# Patient Record
Sex: MALE | Race: WHITE | NOT HISPANIC OR LATINO | Employment: OTHER | URBAN - NONMETROPOLITAN AREA
[De-identification: names, ages, dates, MRNs, and addresses within clinical notes are randomized per-mention and may not be internally consistent; named-entity substitution may affect disease eponyms.]

---

## 2017-05-25 ENCOUNTER — APPOINTMENT (OUTPATIENT)
Dept: GENERAL RADIOLOGY | Facility: HOSPITAL | Age: 72
End: 2017-05-25

## 2017-05-25 ENCOUNTER — HOSPITAL ENCOUNTER (EMERGENCY)
Facility: HOSPITAL | Age: 72
Discharge: HOME | End: 2017-05-25
Attending: FAMILY MEDICINE | Admitting: FAMILY MEDICINE

## 2017-05-25 VITALS
HEIGHT: 67 IN | RESPIRATION RATE: 14 BRPM | BODY MASS INDEX: 25.9 KG/M2 | TEMPERATURE: 97.9 F | HEART RATE: 101 BPM | WEIGHT: 165 LBS | DIASTOLIC BLOOD PRESSURE: 93 MMHG | OXYGEN SATURATION: 97 % | SYSTOLIC BLOOD PRESSURE: 136 MMHG

## 2017-05-25 DIAGNOSIS — R19.7 DIARRHEA, UNSPECIFIED TYPE: ICD-10-CM

## 2017-05-25 DIAGNOSIS — R07.9 CHEST PAIN, UNSPECIFIED TYPE: ICD-10-CM

## 2017-05-25 DIAGNOSIS — K52.9 GASTROENTERITIS: Primary | ICD-10-CM

## 2017-05-25 LAB
ALBUMIN SERPL-MCNC: 4 G/DL (ref 3.5–5)
ALBUMIN/GLOB SERPL: 1.3 G/DL (ref 1.1–2.5)
ALP SERPL-CCNC: 81 U/L (ref 24–120)
ALT SERPL W P-5'-P-CCNC: 15 U/L (ref 0–54)
AMYLASE SERPL-CCNC: 95 U/L (ref 30–110)
ANION GAP SERPL CALCULATED.3IONS-SCNC: 12 MMOL/L (ref 4–13)
AST SERPL-CCNC: 46 U/L (ref 7–45)
BASOPHILS # BLD AUTO: 0.02 10*3/MM3 (ref 0–0.2)
BASOPHILS NFR BLD AUTO: 0.2 % (ref 0–2)
BILIRUB SERPL-MCNC: 0.9 MG/DL (ref 0.1–1)
BILIRUB UR QL STRIP: NEGATIVE
BUN BLD-MCNC: 25 MG/DL (ref 5–21)
BUN/CREAT SERPL: 21.4 (ref 7–25)
CALCIUM SPEC-SCNC: 9.7 MG/DL (ref 8.4–10.4)
CHLORIDE SERPL-SCNC: 104 MMOL/L (ref 98–110)
CK MB SERPL-CCNC: <0.22 NG/ML (ref 0–5)
CLARITY UR: CLEAR
CO2 SERPL-SCNC: 25 MMOL/L (ref 24–31)
COLOR UR: YELLOW
CREAT BLD-MCNC: 1.17 MG/DL (ref 0.5–1.4)
CRP SERPL-MCNC: 0.58 MG/DL (ref 0–0.99)
DEPRECATED RDW RBC AUTO: 45.8 FL (ref 40–54)
EOSINOPHIL # BLD AUTO: 0.07 10*3/MM3 (ref 0–0.7)
EOSINOPHIL NFR BLD AUTO: 0.6 % (ref 0–4)
ERYTHROCYTE [DISTWIDTH] IN BLOOD BY AUTOMATED COUNT: 13.7 % (ref 12–15)
GFR SERPL CREATININE-BSD FRML MDRD: 62 ML/MIN/1.73
GLOBULIN UR ELPH-MCNC: 3 GM/DL
GLUCOSE BLD-MCNC: 117 MG/DL (ref 70–100)
GLUCOSE UR STRIP-MCNC: NEGATIVE MG/DL
HCT VFR BLD AUTO: 48.8 % (ref 40–52)
HGB BLD-MCNC: 16.3 G/DL (ref 14–18)
HGB UR QL STRIP.AUTO: NEGATIVE
HOLD SPECIMEN: NORMAL
HOLD SPECIMEN: NORMAL
KETONES UR QL STRIP: ABNORMAL
LEUKOCYTE ESTERASE UR QL STRIP.AUTO: NEGATIVE
LIPASE SERPL-CCNC: 81 U/L (ref 23–203)
LYMPHOCYTES # BLD AUTO: 0.72 10*3/MM3 (ref 0.72–4.86)
LYMPHOCYTES NFR BLD AUTO: 6.1 % (ref 15–45)
MCH RBC QN AUTO: 31.2 PG (ref 28–32)
MCHC RBC AUTO-ENTMCNC: 33.4 G/DL (ref 33–36)
MCV RBC AUTO: 93.5 FL (ref 82–95)
MONOCYTES # BLD AUTO: 0.38 10*3/MM3 (ref 0.19–1.3)
MONOCYTES NFR BLD AUTO: 3.2 % (ref 4–12)
MYOGLOBIN SERPL-MCNC: 23 NG/ML (ref 0–110)
NEUTROPHILS # BLD AUTO: 10.53 10*3/MM3 (ref 1.87–8.4)
NEUTROPHILS NFR BLD AUTO: 89.9 % (ref 39–78)
NITRITE UR QL STRIP: NEGATIVE
NT-PROBNP SERPL-MCNC: 344 PG/ML (ref 0–900)
PH UR STRIP.AUTO: <=5 [PH] (ref 5–8)
PLATELET # BLD AUTO: 172 10*3/MM3 (ref 130–400)
PMV BLD AUTO: 10.3 FL (ref 6–12)
POTASSIUM BLD-SCNC: 4.2 MMOL/L (ref 3.5–5.3)
PROT SERPL-MCNC: 7 G/DL (ref 6.3–8.7)
PROT UR QL STRIP: NEGATIVE
RBC # BLD AUTO: 5.22 10*6/MM3 (ref 4.8–5.9)
SODIUM BLD-SCNC: 141 MMOL/L (ref 135–145)
SP GR UR STRIP: 1.03 (ref 1–1.03)
TROPONIN I SERPL-MCNC: 0 NG/ML (ref 0–0.07)
TROPONIN I SERPL-MCNC: 0 NG/ML (ref 0–0.07)
UROBILINOGEN UR QL STRIP: ABNORMAL
WBC NRBC COR # BLD: 11.72 10*3/MM3 (ref 4.8–10.8)
WHOLE BLOOD HOLD SPECIMEN: NORMAL
WHOLE BLOOD HOLD SPECIMEN: NORMAL

## 2017-05-25 PROCEDURE — 93005 ELECTROCARDIOGRAM TRACING: CPT | Performed by: FAMILY MEDICINE

## 2017-05-25 PROCEDURE — 82150 ASSAY OF AMYLASE: CPT | Performed by: FAMILY MEDICINE

## 2017-05-25 PROCEDURE — 25010000002 ONDANSETRON PER 1 MG: Performed by: FAMILY MEDICINE

## 2017-05-25 PROCEDURE — 83880 ASSAY OF NATRIURETIC PEPTIDE: CPT | Performed by: FAMILY MEDICINE

## 2017-05-25 PROCEDURE — 81003 URINALYSIS AUTO W/O SCOPE: CPT | Performed by: FAMILY MEDICINE

## 2017-05-25 PROCEDURE — 80053 COMPREHEN METABOLIC PANEL: CPT | Performed by: FAMILY MEDICINE

## 2017-05-25 PROCEDURE — 36415 COLL VENOUS BLD VENIPUNCTURE: CPT

## 2017-05-25 PROCEDURE — 82553 CREATINE MB FRACTION: CPT | Performed by: FAMILY MEDICINE

## 2017-05-25 PROCEDURE — 93005 ELECTROCARDIOGRAM TRACING: CPT

## 2017-05-25 PROCEDURE — 99285 EMERGENCY DEPT VISIT HI MDM: CPT

## 2017-05-25 PROCEDURE — 96374 THER/PROPH/DIAG INJ IV PUSH: CPT

## 2017-05-25 PROCEDURE — 71010 HC CHEST PA OR AP: CPT

## 2017-05-25 PROCEDURE — 93010 ELECTROCARDIOGRAM REPORT: CPT | Performed by: INTERNAL MEDICINE

## 2017-05-25 PROCEDURE — 84484 ASSAY OF TROPONIN QUANT: CPT

## 2017-05-25 PROCEDURE — 85025 COMPLETE CBC W/AUTO DIFF WBC: CPT | Performed by: FAMILY MEDICINE

## 2017-05-25 PROCEDURE — 83690 ASSAY OF LIPASE: CPT | Performed by: FAMILY MEDICINE

## 2017-05-25 PROCEDURE — 83874 ASSAY OF MYOGLOBIN: CPT | Performed by: FAMILY MEDICINE

## 2017-05-25 PROCEDURE — 86140 C-REACTIVE PROTEIN: CPT | Performed by: FAMILY MEDICINE

## 2017-05-25 RX ORDER — LORAZEPAM 1 MG/1
1 TABLET ORAL 2 TIMES DAILY
COMMUNITY

## 2017-05-25 RX ORDER — LEVOCETIRIZINE DIHYDROCHLORIDE 5 MG/1
5 TABLET, FILM COATED ORAL EVERY EVENING
Status: ON HOLD | COMMUNITY
End: 2022-01-04

## 2017-05-25 RX ORDER — SODIUM CHLORIDE 9 MG/ML
125 INJECTION, SOLUTION INTRAVENOUS CONTINUOUS
Status: DISCONTINUED | OUTPATIENT
Start: 2017-05-25 | End: 2017-05-25 | Stop reason: HOSPADM

## 2017-05-25 RX ORDER — ONDANSETRON 4 MG/1
4 TABLET, ORALLY DISINTEGRATING ORAL 4 TIMES DAILY PRN
Qty: 20 TABLET | Refills: 0 | Status: SHIPPED | OUTPATIENT
Start: 2017-05-25 | End: 2021-12-30

## 2017-05-25 RX ORDER — MAGNESIUM HYDROXIDE/ALUMINUM HYDROXICE/SIMETHICONE 120; 1200; 1200 MG/30ML; MG/30ML; MG/30ML
30 SUSPENSION ORAL ONCE
Status: COMPLETED | OUTPATIENT
Start: 2017-05-25 | End: 2017-05-25

## 2017-05-25 RX ORDER — RANITIDINE 150 MG/1
150 TABLET ORAL 2 TIMES DAILY
Qty: 30 TABLET | Refills: 0 | Status: SHIPPED | OUTPATIENT
Start: 2017-05-25 | End: 2017-06-09

## 2017-05-25 RX ORDER — ATENOLOL 50 MG/1
75 TABLET ORAL DAILY
COMMUNITY

## 2017-05-25 RX ORDER — ONDANSETRON 2 MG/ML
4 INJECTION INTRAMUSCULAR; INTRAVENOUS ONCE
Status: COMPLETED | OUTPATIENT
Start: 2017-05-25 | End: 2017-05-25

## 2017-05-25 RX ORDER — TADALAFIL 20 MG/1
20 TABLET ORAL DAILY PRN
COMMUNITY
End: 2017-05-25

## 2017-05-25 RX ORDER — ASPIRIN 81 MG/1
81 TABLET ORAL DAILY
COMMUNITY
End: 2021-12-30

## 2017-05-25 RX ORDER — OMEPRAZOLE 20 MG/1
20 CAPSULE, DELAYED RELEASE ORAL DAILY
COMMUNITY

## 2017-05-25 RX ADMIN — LIDOCAINE HYDROCHLORIDE 15 ML: 20 SOLUTION ORAL; TOPICAL at 14:43

## 2017-05-25 RX ADMIN — ALUMINUM HYDROXIDE, MAGNESIUM HYDROXIDE, AND SIMETHICONE 30 ML: 200; 200; 20 SUSPENSION ORAL at 14:42

## 2017-05-25 RX ADMIN — ONDANSETRON 4 MG: 2 INJECTION INTRAMUSCULAR; INTRAVENOUS at 14:42

## 2017-05-30 ENCOUNTER — APPOINTMENT (OUTPATIENT)
Dept: CARDIOLOGY | Facility: HOSPITAL | Age: 72
End: 2017-05-30
Attending: FAMILY MEDICINE

## 2021-12-13 ENCOUNTER — OFFICE VISIT (OUTPATIENT)
Dept: OTOLARYNGOLOGY | Facility: CLINIC | Age: 76
End: 2021-12-13

## 2021-12-13 VITALS
RESPIRATION RATE: 20 BRPM | HEIGHT: 67 IN | SYSTOLIC BLOOD PRESSURE: 142 MMHG | WEIGHT: 165 LBS | DIASTOLIC BLOOD PRESSURE: 74 MMHG | TEMPERATURE: 98 F | HEART RATE: 82 BPM | BODY MASS INDEX: 25.9 KG/M2

## 2021-12-13 DIAGNOSIS — C44.41 BASAL CELL CARCINOMA, SCALP/NECK: Primary | ICD-10-CM

## 2021-12-13 PROCEDURE — 99203 OFFICE O/P NEW LOW 30 MIN: CPT | Performed by: OTOLARYNGOLOGY

## 2021-12-13 RX ORDER — TAMSULOSIN HYDROCHLORIDE 0.4 MG/1
1 CAPSULE ORAL NIGHTLY
COMMUNITY

## 2021-12-13 RX ORDER — GUAIFENESIN 600 MG/1
600 TABLET, EXTENDED RELEASE ORAL AS NEEDED
COMMUNITY

## 2021-12-13 NOTE — PROGRESS NOTES
PRIMARY CARE PROVIDER: Sanjeev Meraz MD  REFERRING PROVIDER: No ref. provider found    Chief Complaint   Patient presents with   • Skin Lesion     bcc of scalp       Subjective   History of Present Illness:  Sabas Jones is a  76 y.o. male who presents for surgical consultation regarding a biopsy-proven basal cell carcinoma of the left scalp.  Prior to the biopsy, the lesion had the following characteristics:    Quality: Itching area  Severity: Moderate  Duration: Months  Timing: constant  Modifying Factors: None  Associated Signs & Symptoms: No pain or lymph node swelling    Review of Systems:  Review of Systems   Constitutional: Negative for chills, fatigue, fever and unexpected weight change.   HENT: Negative for facial swelling.    Respiratory: Negative for cough, chest tightness and shortness of breath.    Cardiovascular: Negative for chest pain.   Musculoskeletal: Negative for neck pain.   Skin: Negative for color change.   Neurological: Negative for facial asymmetry.   Hematological: Negative for adenopathy. Does not bruise/bleed easily.       Past History:  Past Medical History:   Diagnosis Date   • BCC (basal cell carcinoma)     scalp bcc   • Hypertension      Past Surgical History:   Procedure Laterality Date   • KNEE SURGERY       History reviewed. No pertinent family history.  Social History     Tobacco Use   • Smoking status: Never Smoker   • Smokeless tobacco: Never Used   Substance Use Topics   • Alcohol use: Yes     Comment: 1-2 liquor drinks /week   • Drug use: No     Allergies:  Patient has no known allergies.    Current Outpatient Medications:   •  atenolol (TENORMIN) 50 MG tablet, Take 50 mg by mouth Daily., Disp: , Rfl:   •  clomiPHENE (CLOMID) 50 MG tablet, Take 25 mg by mouth Daily. Take half tablet for 25 days of each month.  PATIENT REPORTS THAT HE TAKES IT UP UNTIL THE 25TH AND THEN STARTS IT BACK ON THE 1ST OF THE MONTH, Disp: , Rfl:   •  guaiFENesin (Mucinex) 600 MG 12 hr tablet,  Mucinex  1 tablet weekly, Disp: , Rfl:   •  levocetirizine (XYZAL) 5 MG tablet, Take 5 mg by mouth Every Evening., Disp: , Rfl:   •  LORazepam (ATIVAN) 1 MG tablet, Take 1 mg by mouth 2 (Two) Times a Day., Disp: , Rfl:   •  omeprazole (priLOSEC) 20 MG capsule, Take 20 mg by mouth Daily., Disp: , Rfl:   •  ondansetron ODT (ZOFRAN-ODT) 4 MG disintegrating tablet, Take 1 tablet by mouth 4 (Four) Times a Day As Needed for Nausea or Vomiting., Disp: 20 tablet, Rfl: 0  •  tamsulosin (FLOMAX) 0.4 MG capsule 24 hr capsule, tamsulosin 0.4 mg capsule  TAKE 1 CAPSULE BY MOUTH EVERY DAY, Disp: , Rfl:   •  aspirin 81 MG EC tablet, Take 81 mg by mouth Daily., Disp: , Rfl:     Objective     Vital Signs:  Temp:  [98 °F (36.7 °C)] 98 °F (36.7 °C)  Heart Rate:  [82] 82  Resp:  [20] 20  BP: (142)/(74) 142/74    Physical Exam:  Physical Exam  Vitals and nursing note reviewed.   Constitutional:       General: He is not in acute distress.     Appearance: He is well-developed. He is not diaphoretic.   HENT:      Head: Normocephalic and atraumatic.        Right Ear: External ear normal.      Left Ear: External ear normal.      Nose: Nose normal.   Eyes:      General: No scleral icterus.        Right eye: No discharge.         Left eye: No discharge.      Conjunctiva/sclera: Conjunctivae normal.      Pupils: Pupils are equal, round, and reactive to light.   Neck:      Thyroid: No thyromegaly.      Vascular: No JVD.      Trachea: No tracheal deviation.   Pulmonary:      Effort: Pulmonary effort is normal.      Breath sounds: No stridor.   Musculoskeletal:         General: No deformity. Normal range of motion.      Cervical back: Normal range of motion and neck supple.   Lymphadenopathy:      Cervical: No cervical adenopathy.   Skin:     General: Skin is warm and dry.      Coloration: Skin is not pale.      Findings: No erythema or rash.   Neurological:      Mental Status: He is alert and oriented to person, place, and time.      Cranial  Nerves: No cranial nerve deficit.      Coordination: Coordination normal.   Psychiatric:         Speech: Speech normal.         Behavior: Behavior normal. Behavior is cooperative.         Thought Content: Thought content normal.         Judgment: Judgment normal.         Data Review:  I have personally reviewed the pathology report demonstrating a basal cell carcinoma of the scalp::      Operative plan:    Rotational scalp flap    Assessment   1. Basal cell carcinoma, scalp/neck        Plan     I have offered excision of the basal cell carcinoma of the scalp with  frozen section analysis and likely  rotational flap closure closure in the operating room under anesthesia.    Discussion of skin lesion. Discussed risks, benefits, alternatives, and possible complications of excision of the skin lesion with reconstruction utilizing local tissue rearrangement, full-thickness skin grafting, or local interpolated flaps. Risks include, but are not limited too: bleeding, infection, hematoma, recurrence, need for additional procedures, flap failure, cosmetic deformity. Patient understands risks and would like to proceed with surgery.     My findings and recommendations were discussed and questions were answered.     Mannie Armenta MD  12/13/21  11:18 CST

## 2021-12-15 PROBLEM — C44.41 BASAL CELL CARCINOMA, SCALP/NECK: Status: ACTIVE | Noted: 2021-12-15

## 2021-12-16 ENCOUNTER — NURSE TRIAGE (OUTPATIENT)
Dept: CALL CENTER | Facility: HOSPITAL | Age: 76
End: 2021-12-16

## 2021-12-17 NOTE — TELEPHONE ENCOUNTER
"    Reason for Disposition  • Blood in urine  (Exception: could be normal menstrual bleeding)    Additional Information  • Negative: Shock suspected (e.g., cold/pale/clammy skin, too weak to stand, low BP, rapid pulse)  • Negative: Sounds like a life-threatening emergency to the triager  • Negative: Urinary catheter, questions about  • Negative: Recent back or abdominal injury  • Negative: Recent genital injury  • Negative: [1] Unable to urinate (or only a few drops) > 4 hours AND [2] bladder feels very full (e.g., palpable bladder or strong urge to urinate)  • Negative: Passing pure blood or large blood clots (i.e., size > a dime) (Exception: silver or small strands)  • Negative: Fever > 100.4 F (38.0 C)  • Negative: Patient sounds very sick or weak to the triager  • Negative: [1] Pain or burning with passing urine AND [2] side (flank) or back pain present  • Negative: Known sickle cell disease  • Negative: Taking Coumadin (warfarin) or other strong blood thinner, or known bleeding disorder (e.g., thrombocytopenia)  • Negative: Pain or burning with passing urine  • Negative: Side (flank) or back pain present  • Negative: [1] Pink or red-colored urine and likely from food (beets, rhubarb, red food dye) AND [2] lasts > 24 hours after stopping food    Answer Assessment - Initial Assessment Questions  1. COLOR of URINE: \"Describe the color of the urine.\"  (e.g., tea-colored, pink, red, blood clots, bloody)       yellow  2. ONSET: \"When did the bleeding start?\"      tonight  3. EPISODES: \"How many times has there been blood in the urine?\" or \"How many times today?\"      2   4. PAIN with URINATION: \"Is there any pain with passing your urine?\" If Yes, ask: \"How bad is the pain?\"  (Scale 1-10; or mild, moderate, severe)     - MILD - complains slightly about urination hurting     - MODERATE - interferes with normal activities       - SEVERE - excruciating, unwilling or unable to urinate because of the pain       no pain  5. " "FEVER: \"Do you have a fever?\" If Yes, ask: \"What is your temperature, how was it measured, and when did it start?\"      \denies  6. ASSOCIATED SYMPTOMS: \"Are you passing urine more frequently than usual?\"      no  7. OTHER SYMPTOMS: \"Do you have any other symptoms?\" (e.g., back/flank pain, abdominal pain, vomiting)      \none, has 2 little black thiongs that came out after uyrinating last time. He will call doctor in morning and bring them to appt    8. PREGNANCY: \"Is there any chance you are pregnant?\" \"When was your last menstrual period?\"      no    Protocols used: URINE - BLOOD IN-ADULT-      "

## 2021-12-30 ENCOUNTER — LAB (OUTPATIENT)
Dept: LAB | Facility: HOSPITAL | Age: 76
End: 2021-12-30

## 2021-12-30 ENCOUNTER — PRE-ADMISSION TESTING (OUTPATIENT)
Dept: PREADMISSION TESTING | Facility: HOSPITAL | Age: 76
End: 2021-12-30

## 2021-12-30 VITALS
DIASTOLIC BLOOD PRESSURE: 91 MMHG | BODY MASS INDEX: 26.44 KG/M2 | HEART RATE: 75 BPM | OXYGEN SATURATION: 100 % | HEIGHT: 67 IN | WEIGHT: 168.43 LBS | RESPIRATION RATE: 20 BRPM | SYSTOLIC BLOOD PRESSURE: 140 MMHG

## 2021-12-30 DIAGNOSIS — C44.41 BASAL CELL CARCINOMA, SCALP/NECK: ICD-10-CM

## 2021-12-30 LAB
ANION GAP SERPL CALCULATED.3IONS-SCNC: 9 MMOL/L (ref 5–15)
BUN SERPL-MCNC: 22 MG/DL (ref 8–23)
BUN/CREAT SERPL: 21 (ref 7–25)
CALCIUM SPEC-SCNC: 9.5 MG/DL (ref 8.6–10.5)
CHLORIDE SERPL-SCNC: 106 MMOL/L (ref 98–107)
CO2 SERPL-SCNC: 27 MMOL/L (ref 22–29)
CREAT SERPL-MCNC: 1.05 MG/DL (ref 0.76–1.27)
DEPRECATED RDW RBC AUTO: 48.4 FL (ref 37–54)
ERYTHROCYTE [DISTWIDTH] IN BLOOD BY AUTOMATED COUNT: 13.6 % (ref 12.3–15.4)
GFR SERPL CREATININE-BSD FRML MDRD: 69 ML/MIN/1.73
GLUCOSE SERPL-MCNC: 110 MG/DL (ref 65–99)
HCT VFR BLD AUTO: 45.4 % (ref 37.5–51)
HGB BLD-MCNC: 14.3 G/DL (ref 13–17.7)
MCH RBC QN AUTO: 30.4 PG (ref 26.6–33)
MCHC RBC AUTO-ENTMCNC: 31.5 G/DL (ref 31.5–35.7)
MCV RBC AUTO: 96.4 FL (ref 79–97)
PLATELET # BLD AUTO: 199 10*3/MM3 (ref 140–450)
PMV BLD AUTO: 10.1 FL (ref 6–12)
POTASSIUM SERPL-SCNC: 4.6 MMOL/L (ref 3.5–5.2)
RBC # BLD AUTO: 4.71 10*6/MM3 (ref 4.14–5.8)
SARS-COV-2 ORF1AB RESP QL NAA+PROBE: NOT DETECTED
SODIUM SERPL-SCNC: 142 MMOL/L (ref 136–145)
WBC NRBC COR # BLD: 6.74 10*3/MM3 (ref 3.4–10.8)

## 2021-12-30 PROCEDURE — 85027 COMPLETE CBC AUTOMATED: CPT

## 2021-12-30 PROCEDURE — 36415 COLL VENOUS BLD VENIPUNCTURE: CPT

## 2021-12-30 PROCEDURE — U0004 COV-19 TEST NON-CDC HGH THRU: HCPCS

## 2021-12-30 PROCEDURE — 93005 ELECTROCARDIOGRAM TRACING: CPT

## 2021-12-30 PROCEDURE — 93010 ELECTROCARDIOGRAM REPORT: CPT | Performed by: INTERNAL MEDICINE

## 2021-12-30 PROCEDURE — C9803 HOPD COVID-19 SPEC COLLECT: HCPCS

## 2021-12-30 PROCEDURE — 80048 BASIC METABOLIC PNL TOTAL CA: CPT

## 2021-12-30 RX ORDER — MULTIPLE VITAMINS W/ MINERALS TAB 9MG-400MCG
1 TAB ORAL DAILY
COMMUNITY

## 2021-12-30 RX ORDER — CLINDAMYCIN PHOSPHATE 11.9 MG/ML
1 SOLUTION TOPICAL NIGHTLY PRN
COMMUNITY

## 2021-12-31 LAB
QT INTERVAL: 380 MS
QTC INTERVAL: 416 MS

## 2022-01-04 ENCOUNTER — ANESTHESIA EVENT (OUTPATIENT)
Dept: PERIOP | Facility: HOSPITAL | Age: 77
End: 2022-01-04

## 2022-01-04 ENCOUNTER — HOSPITAL ENCOUNTER (OUTPATIENT)
Facility: HOSPITAL | Age: 77
Setting detail: HOSPITAL OUTPATIENT SURGERY
Discharge: HOME | End: 2022-01-04
Attending: OTOLARYNGOLOGY | Admitting: OTOLARYNGOLOGY

## 2022-01-04 ENCOUNTER — ANESTHESIA (OUTPATIENT)
Dept: PERIOP | Facility: HOSPITAL | Age: 77
End: 2022-01-04

## 2022-01-04 VITALS
DIASTOLIC BLOOD PRESSURE: 72 MMHG | SYSTOLIC BLOOD PRESSURE: 127 MMHG | RESPIRATION RATE: 16 BRPM | TEMPERATURE: 97.4 F | OXYGEN SATURATION: 92 % | HEART RATE: 67 BPM

## 2022-01-04 DIAGNOSIS — C44.41 BASAL CELL CARCINOMA, SCALP/NECK: ICD-10-CM

## 2022-01-04 PROCEDURE — 14021 TIS TRNFR S/A/L 10.1-30 SQCM: CPT | Performed by: OTOLARYNGOLOGY

## 2022-01-04 PROCEDURE — 25010000002 DEXAMETHASONE PER 1 MG: Performed by: ANESTHESIOLOGY

## 2022-01-04 PROCEDURE — 88331 PATH CONSLTJ SURG 1 BLK 1SPC: CPT | Performed by: PATHOLOGY

## 2022-01-04 PROCEDURE — 25010000002 CEFAZOLIN PER 500 MG: Performed by: OTOLARYNGOLOGY

## 2022-01-04 PROCEDURE — 25010000002 ONDANSETRON PER 1 MG: Performed by: NURSE ANESTHETIST, CERTIFIED REGISTERED

## 2022-01-04 PROCEDURE — 25010000002 FENTANYL CITRATE (PF) 100 MCG/2ML SOLUTION: Performed by: NURSE ANESTHETIST, CERTIFIED REGISTERED

## 2022-01-04 PROCEDURE — 25010000002 ONDANSETRON PER 1 MG: Performed by: ANESTHESIOLOGY

## 2022-01-04 PROCEDURE — 88305 TISSUE EXAM BY PATHOLOGIST: CPT | Performed by: OTOLARYNGOLOGY

## 2022-01-04 PROCEDURE — 25010000002 PROPOFOL 10 MG/ML EMULSION: Performed by: NURSE ANESTHETIST, CERTIFIED REGISTERED

## 2022-01-04 PROCEDURE — 25010000002 DEXAMETHASONE PER 1 MG: Performed by: NURSE ANESTHETIST, CERTIFIED REGISTERED

## 2022-01-04 RX ORDER — EPHEDRINE SULFATE 50 MG/ML
INJECTION, SOLUTION INTRAVENOUS AS NEEDED
Status: DISCONTINUED | OUTPATIENT
Start: 2022-01-04 | End: 2022-01-04 | Stop reason: SURG

## 2022-01-04 RX ORDER — PROPOFOL 10 MG/ML
VIAL (ML) INTRAVENOUS AS NEEDED
Status: DISCONTINUED | OUTPATIENT
Start: 2022-01-04 | End: 2022-01-04 | Stop reason: SURG

## 2022-01-04 RX ORDER — SODIUM CHLORIDE 0.9 % (FLUSH) 0.9 %
3 SYRINGE (ML) INJECTION AS NEEDED
Status: DISCONTINUED | OUTPATIENT
Start: 2022-01-04 | End: 2022-01-04 | Stop reason: HOSPADM

## 2022-01-04 RX ORDER — SODIUM CHLORIDE 0.9 % (FLUSH) 0.9 %
3-10 SYRINGE (ML) INJECTION AS NEEDED
Status: DISCONTINUED | OUTPATIENT
Start: 2022-01-04 | End: 2022-01-04 | Stop reason: HOSPADM

## 2022-01-04 RX ORDER — FLUMAZENIL 0.1 MG/ML
0.2 INJECTION INTRAVENOUS AS NEEDED
Status: DISCONTINUED | OUTPATIENT
Start: 2022-01-04 | End: 2022-01-04 | Stop reason: HOSPADM

## 2022-01-04 RX ORDER — IBUPROFEN 600 MG/1
600 TABLET ORAL ONCE AS NEEDED
Status: DISCONTINUED | OUTPATIENT
Start: 2022-01-04 | End: 2022-01-04 | Stop reason: HOSPADM

## 2022-01-04 RX ORDER — LIDOCAINE HYDROCHLORIDE 20 MG/ML
INJECTION, SOLUTION EPIDURAL; INFILTRATION; INTRACAUDAL; PERINEURAL AS NEEDED
Status: DISCONTINUED | OUTPATIENT
Start: 2022-01-04 | End: 2022-01-04 | Stop reason: SURG

## 2022-01-04 RX ORDER — LIDOCAINE HYDROCHLORIDE 10 MG/ML
0.5 INJECTION, SOLUTION EPIDURAL; INFILTRATION; INTRACAUDAL; PERINEURAL ONCE AS NEEDED
Status: DISCONTINUED | OUTPATIENT
Start: 2022-01-04 | End: 2022-01-04 | Stop reason: HOSPADM

## 2022-01-04 RX ORDER — HYDROCODONE BITARTRATE AND ACETAMINOPHEN 7.5; 325 MG/1; MG/1
1 TABLET ORAL EVERY 4 HOURS PRN
Qty: 18 TABLET | Refills: 0 | Status: SHIPPED | OUTPATIENT
Start: 2022-01-04 | End: 2022-01-07

## 2022-01-04 RX ORDER — ONDANSETRON 2 MG/ML
INJECTION INTRAMUSCULAR; INTRAVENOUS AS NEEDED
Status: DISCONTINUED | OUTPATIENT
Start: 2022-01-04 | End: 2022-01-04 | Stop reason: SURG

## 2022-01-04 RX ORDER — FENTANYL CITRATE 50 UG/ML
INJECTION, SOLUTION INTRAMUSCULAR; INTRAVENOUS AS NEEDED
Status: DISCONTINUED | OUTPATIENT
Start: 2022-01-04 | End: 2022-01-04 | Stop reason: SURG

## 2022-01-04 RX ORDER — SODIUM CHLORIDE, SODIUM LACTATE, POTASSIUM CHLORIDE, CALCIUM CHLORIDE 600; 310; 30; 20 MG/100ML; MG/100ML; MG/100ML; MG/100ML
1000 INJECTION, SOLUTION INTRAVENOUS CONTINUOUS
Status: DISCONTINUED | OUTPATIENT
Start: 2022-01-04 | End: 2022-01-04 | Stop reason: HOSPADM

## 2022-01-04 RX ORDER — LABETALOL HYDROCHLORIDE 5 MG/ML
5 INJECTION, SOLUTION INTRAVENOUS
Status: DISCONTINUED | OUTPATIENT
Start: 2022-01-04 | End: 2022-01-04 | Stop reason: HOSPADM

## 2022-01-04 RX ORDER — BUPIVACAINE HCL/0.9 % NACL/PF 0.1 %
2 PLASTIC BAG, INJECTION (ML) EPIDURAL ONCE
Status: COMPLETED | OUTPATIENT
Start: 2022-01-04 | End: 2022-01-04

## 2022-01-04 RX ORDER — NALOXONE HCL 0.4 MG/ML
0.4 VIAL (ML) INJECTION AS NEEDED
Status: DISCONTINUED | OUTPATIENT
Start: 2022-01-04 | End: 2022-01-04 | Stop reason: HOSPADM

## 2022-01-04 RX ORDER — MIDAZOLAM HYDROCHLORIDE 1 MG/ML
0.5 INJECTION INTRAMUSCULAR; INTRAVENOUS
Status: DISCONTINUED | OUTPATIENT
Start: 2022-01-04 | End: 2022-01-04 | Stop reason: HOSPADM

## 2022-01-04 RX ORDER — ROCURONIUM BROMIDE 10 MG/ML
INJECTION, SOLUTION INTRAVENOUS AS NEEDED
Status: DISCONTINUED | OUTPATIENT
Start: 2022-01-04 | End: 2022-01-04 | Stop reason: SURG

## 2022-01-04 RX ORDER — OXYCODONE AND ACETAMINOPHEN 7.5; 325 MG/1; MG/1
2 TABLET ORAL EVERY 4 HOURS PRN
Status: DISCONTINUED | OUTPATIENT
Start: 2022-01-04 | End: 2022-01-04 | Stop reason: HOSPADM

## 2022-01-04 RX ORDER — OXYCODONE AND ACETAMINOPHEN 10; 325 MG/1; MG/1
1 TABLET ORAL ONCE AS NEEDED
Status: COMPLETED | OUTPATIENT
Start: 2022-01-04 | End: 2022-01-04

## 2022-01-04 RX ORDER — SODIUM CHLORIDE 0.9 % (FLUSH) 0.9 %
3 SYRINGE (ML) INJECTION EVERY 12 HOURS SCHEDULED
Status: DISCONTINUED | OUTPATIENT
Start: 2022-01-04 | End: 2022-01-04 | Stop reason: HOSPADM

## 2022-01-04 RX ORDER — ONDANSETRON 2 MG/ML
4 INJECTION INTRAMUSCULAR; INTRAVENOUS ONCE AS NEEDED
Status: COMPLETED | OUTPATIENT
Start: 2022-01-04 | End: 2022-01-04

## 2022-01-04 RX ORDER — HYDROCODONE BITARTRATE AND ACETAMINOPHEN 7.5; 325 MG/1; MG/1
1 TABLET ORAL ONCE AS NEEDED
Status: DISCONTINUED | OUTPATIENT
Start: 2022-01-04 | End: 2022-01-04 | Stop reason: HOSPADM

## 2022-01-04 RX ORDER — PHENYLEPHRINE HCL IN 0.9% NACL 1 MG/10 ML
SYRINGE (ML) INTRAVENOUS AS NEEDED
Status: DISCONTINUED | OUTPATIENT
Start: 2022-01-04 | End: 2022-01-04 | Stop reason: SURG

## 2022-01-04 RX ORDER — ACETAMINOPHEN 500 MG
1000 TABLET ORAL ONCE
Status: COMPLETED | OUTPATIENT
Start: 2022-01-04 | End: 2022-01-04

## 2022-01-04 RX ORDER — DEXAMETHASONE SODIUM PHOSPHATE 4 MG/ML
INJECTION, SOLUTION INTRA-ARTICULAR; INTRALESIONAL; INTRAMUSCULAR; INTRAVENOUS; SOFT TISSUE AS NEEDED
Status: DISCONTINUED | OUTPATIENT
Start: 2022-01-04 | End: 2022-01-04 | Stop reason: SURG

## 2022-01-04 RX ORDER — LIDOCAINE HYDROCHLORIDE AND EPINEPHRINE 10; 10 MG/ML; UG/ML
INJECTION, SOLUTION INFILTRATION; PERINEURAL AS NEEDED
Status: DISCONTINUED | OUTPATIENT
Start: 2022-01-04 | End: 2022-01-04 | Stop reason: HOSPADM

## 2022-01-04 RX ORDER — DEXAMETHASONE SODIUM PHOSPHATE 4 MG/ML
4 INJECTION, SOLUTION INTRA-ARTICULAR; INTRALESIONAL; INTRAMUSCULAR; INTRAVENOUS; SOFT TISSUE ONCE AS NEEDED
Status: COMPLETED | OUTPATIENT
Start: 2022-01-04 | End: 2022-01-04

## 2022-01-04 RX ORDER — SODIUM CHLORIDE, SODIUM LACTATE, POTASSIUM CHLORIDE, CALCIUM CHLORIDE 600; 310; 30; 20 MG/100ML; MG/100ML; MG/100ML; MG/100ML
100 INJECTION, SOLUTION INTRAVENOUS CONTINUOUS
Status: DISCONTINUED | OUTPATIENT
Start: 2022-01-04 | End: 2022-01-04 | Stop reason: HOSPADM

## 2022-01-04 RX ORDER — FENTANYL CITRATE 50 UG/ML
25 INJECTION, SOLUTION INTRAMUSCULAR; INTRAVENOUS
Status: DISCONTINUED | OUTPATIENT
Start: 2022-01-04 | End: 2022-01-04 | Stop reason: HOSPADM

## 2022-01-04 RX ORDER — MAGNESIUM HYDROXIDE 1200 MG/15ML
LIQUID ORAL AS NEEDED
Status: DISCONTINUED | OUTPATIENT
Start: 2022-01-04 | End: 2022-01-04 | Stop reason: HOSPADM

## 2022-01-04 RX ORDER — NEOSTIGMINE METHYLSULFATE 5 MG/5 ML
SYRINGE (ML) INTRAVENOUS AS NEEDED
Status: DISCONTINUED | OUTPATIENT
Start: 2022-01-04 | End: 2022-01-04 | Stop reason: SURG

## 2022-01-04 RX ADMIN — ACETAMINOPHEN 1000 MG: 500 TABLET ORAL at 07:18

## 2022-01-04 RX ADMIN — Medication 100 MCG: at 08:11

## 2022-01-04 RX ADMIN — Medication 4 MG: at 08:28

## 2022-01-04 RX ADMIN — SODIUM CHLORIDE, POTASSIUM CHLORIDE, SODIUM LACTATE AND CALCIUM CHLORIDE 1000 ML: 600; 310; 30; 20 INJECTION, SOLUTION INTRAVENOUS at 06:34

## 2022-01-04 RX ADMIN — LIDOCAINE HYDROCHLORIDE 100 MG: 20 INJECTION, SOLUTION EPIDURAL; INFILTRATION; INTRACAUDAL; PERINEURAL at 07:43

## 2022-01-04 RX ADMIN — EPHEDRINE SULFATE 5 MG: 50 INJECTION INTRAVENOUS at 08:17

## 2022-01-04 RX ADMIN — Medication 2 G: at 07:41

## 2022-01-04 RX ADMIN — PROPOFOL 50 MG: 10 INJECTION, EMULSION INTRAVENOUS at 08:31

## 2022-01-04 RX ADMIN — Medication 100 MCG: at 08:07

## 2022-01-04 RX ADMIN — DEXAMETHASONE SODIUM PHOSPHATE 4 MG: 4 INJECTION, SOLUTION INTRA-ARTICULAR; INTRALESIONAL; INTRAMUSCULAR; INTRAVENOUS; SOFT TISSUE at 07:18

## 2022-01-04 RX ADMIN — GLYCOPYRROLATE 0.2 MG: 0.2 INJECTION, SOLUTION INTRAMUSCULAR; INTRAVENOUS at 08:07

## 2022-01-04 RX ADMIN — FENTANYL CITRATE 50 MCG: 50 INJECTION, SOLUTION INTRAMUSCULAR; INTRAVENOUS at 08:35

## 2022-01-04 RX ADMIN — ONDANSETRON 4 MG: 2 INJECTION INTRAMUSCULAR; INTRAVENOUS at 09:32

## 2022-01-04 RX ADMIN — FENTANYL CITRATE 50 MCG: 50 INJECTION, SOLUTION INTRAMUSCULAR; INTRAVENOUS at 07:43

## 2022-01-04 RX ADMIN — PROPOFOL 150 MG: 10 INJECTION, EMULSION INTRAVENOUS at 07:43

## 2022-01-04 RX ADMIN — ROCURONIUM BROMIDE 50 MG: 10 INJECTION INTRAVENOUS at 07:43

## 2022-01-04 RX ADMIN — ONDANSETRON 4 MG: 2 INJECTION INTRAMUSCULAR; INTRAVENOUS at 07:53

## 2022-01-04 RX ADMIN — DEXAMETHASONE SODIUM PHOSPHATE 4 MG: 4 INJECTION, SOLUTION INTRA-ARTICULAR; INTRALESIONAL; INTRAMUSCULAR; INTRAVENOUS; SOFT TISSUE at 07:58

## 2022-01-04 RX ADMIN — OXYCODONE AND ACETAMINOPHEN 1 TABLET: 325; 10 TABLET ORAL at 09:32

## 2022-01-04 RX ADMIN — GLYCOPYRROLATE 0.4 MG: 0.2 INJECTION, SOLUTION INTRAMUSCULAR; INTRAVENOUS at 08:25

## 2022-01-04 RX ADMIN — Medication 100 MCG: at 08:02

## 2022-01-04 NOTE — OP NOTE
PATIENT NAME:  Sabas Jones    DATE:  01/04/22    REOPERATIVE DIAGNOSIS: Basal cell carcinoma skin of left temporal scalp    POSTOPERATIVE DIAGNOSIS: Basal cell carcinoma skin of left temporal scalp    PROCEDURE:  1) excision of malignant neoplasm skin of scalp, 1.5 cm x 1.5 cm    2) local tissue rearrangement of scalp (rotational flap), 5.5 cm x 3.0 cm    SURGEON:  Mannie Armenta MD, FACS    FACILITY: [facility] Operating Room    ANESTHESIA: General    DICTATED BY:  Mannie Armenta MD, FACS    IVF: Per anesthesia    EBL: 100 cc    IMPLANTS: None    DRAINS: None    SPECIMENS: Basal cell carcinoma skin of scalp, stitch at 12:00-frozen    COMPLICATIONS: None apparent    INDICATIONS FOR SURGERY: Mr. Jones presented with a biopsy-proven basal cell carcinoma of the scalp.  He opted for surgical excision and reconstruction.    OPERATIVE FINDINGS:     Lesion: 6 mm x 6 mm  Margins: 4.5 mm  Defect: 1.5 cm x 1.5 cm  Flap and Defect: 5.5 cm x 3.0 cm  Depth: Through dermis      OPERATIVE DETAILS:    After patient verification and informed consent was obtained, the patient was taken to the operating room and laid supine on the operative table.  After the induction of general anesthesia, the skin was cleansed with alcohol.  Hair in the surgical site was clipped with surgical clippers.  The skin was then infiltrated with 1% lidocaine of 1:100,000 epinephrine.  The skin was sterilely prepped with Betadine and the patient was sterilely draped.    The skin was incised using a 15 blade and undermined in the subcutaneous plane using the 15 blade.  I oriented this with a stitch at 12 o'clock and sent this for frozen section analysis.      Frozen section analysis demonstrated clear peripheral and deep margins.    A rotational flap was designed recruited from posteriorly and pedicled inferiorly.    The skin of the flaps was then incised lysing a fresh 15 blade, and the flap was undermined in the subcutaneous plane.    The  flap was rotated into place.  The standing cutaneous deformity at the rotational axis was excised and discarded.  The skin was then closed utilizing 3 -0 undyed Vicryl suture in buried fashion.  The overlying skin was closed with 4-0 Monocryl suture in a running, locking fashion.      Antibiotic ointment was placed to the incisions and flap.    The patient was weaned from anesthesia, and transferred to the PACU for recovery without apparent complication.        DISPOSITION:  The procedures were completed without complication and tolerated well.  The patient was released in the company of his brother to return home in satisfactory condition.  A follow-up appointment will be scheduled, routine post-op medications prescribed (if required), and post-op instructions were given to the responsible party.           Mannie Armenta MD, FACS  Board Certified Facial Plastic and Reconstructive Surgery  Board Certified Otolaryngology -- Head and Neck Surgery    Electronically signed by Mannie Armenta MD, 01/04/22, 8:42 AM CST.

## 2022-01-04 NOTE — ANESTHESIA PROCEDURE NOTES
Airway  Urgency: elective    Date/Time: 1/4/2022 7:44 AM  Airway not difficult    General Information and Staff    Patient location during procedure: OR  CRNA: Tim Smiley CRNA    Indications and Patient Condition  Indications for airway management: airway protection    Preoxygenated: yes  Mask difficulty assessment: 1 - vent by mask    Final Airway Details  Final airway type: endotracheal airway      Successful airway: ETT  Cuffed: yes   Successful intubation technique: direct laryngoscopy  Facilitating devices/methods: intubating stylet  Endotracheal tube insertion site: oral  Blade: Yazmin  Blade size: 3.5  ETT size (mm): 7.5  Cormack-Lehane Classification: grade IIa - partial view of glottis  Placement verified by: chest auscultation and capnometry   Cuff volume (mL): 8  Measured from: teeth  ETT/EBT  to teeth (cm): 22  Number of attempts at approach: 1  Assessment: lips, teeth, and gum same as pre-op and atraumatic intubation

## 2022-01-04 NOTE — DISCHARGE INSTRUCTIONS

## 2022-01-04 NOTE — ANESTHESIA PREPROCEDURE EVALUATION
Anesthesia Evaluation     Patient summary reviewed   no history of anesthetic complications:  NPO Solid Status: > 8 hours  NPO Liquid Status: > 8 hours           Airway   Mallampati: I  TM distance: >3 FB  Neck ROM: full  No difficulty expected  Dental - normal exam     Pulmonary    (-) asthma, sleep apnea, not a smoker  Cardiovascular   Exercise tolerance: good (4-7 METS)    ECG reviewed    (+) hypertension,   (-) past MI, CAD, dysrhythmias, cardiac stents, hyperlipidemia      Neuro/Psych  (-) seizures, TIA, CVA  GI/Hepatic/Renal/Endo    (+)  GERD,    (-) liver disease, no renal disease, diabetes    Musculoskeletal     Abdominal    Substance History      OB/GYN          Other      history of cancer (basal cell)                    Anesthesia Plan    ASA 2     general     intravenous induction     Anesthetic plan, all risks, benefits, and alternatives have been provided, discussed and informed consent has been obtained with: patient.

## 2022-01-04 NOTE — ANESTHESIA POSTPROCEDURE EVALUATION
Patient: Sabas Jones    Procedure Summary     Date: 01/04/22 Room / Location: [facility] OR  /  [facility] OR    Anesthesia Start: 0739 Anesthesia Stop: 0852    Procedure: Excision of basal cell carcinoma of the left scalp with rotational scalp flap closure (Left ) Diagnosis:       Basal cell carcinoma, scalp/neck      (Basal cell carcinoma, scalp/neck [C44.41])    Surgeons: Mannie Armenta MD Provider:     Anesthesia Type: general ASA Status: 2          Anesthesia Type: general    Vitals  Vitals Value Taken Time   /80 01/04/22 0943   Temp 97.4 °F (36.3 °C) 01/04/22 0933   Pulse 65 01/04/22 0943   Resp 16 01/04/22 0943   SpO2 95 % 01/04/22 0943           Post Anesthesia Care and Evaluation    Patient location during evaluation: PACU  Patient participation: complete - patient participated  Level of consciousness: awake and alert  Pain management: adequate  Airway patency: patent  Anesthetic complications: No anesthetic complications  PONV Status: none  Cardiovascular status: acceptable and hemodynamically stable  Respiratory status: acceptable  Hydration status: acceptable    Comments: Blood pressure 125/78, pulse 68, temperature 97.4 °F (36.3 °C), resp. rate 16, SpO2 96 %.    Patient discharged from PACU based upon Aurelio score. Please see RN notes for further details

## 2022-01-04 NOTE — INTERVAL H&P NOTE
H&P updated. The patient was examined and the following changes are noted:  He is not on Clomid, no idea what that is about.

## 2022-01-05 LAB
CYTO UR: NORMAL
LAB AP CASE REPORT: NORMAL
LAB AP CLINICAL INFORMATION: NORMAL
Lab: NORMAL
PATH REPORT.FINAL DX SPEC: NORMAL
PATH REPORT.GROSS SPEC: NORMAL

## 2022-01-18 ENCOUNTER — OFFICE VISIT (OUTPATIENT)
Dept: OTOLARYNGOLOGY | Facility: CLINIC | Age: 77
End: 2022-01-18

## 2022-01-18 DIAGNOSIS — Z48.02 VISIT FOR SUTURE REMOVAL: Primary | ICD-10-CM

## 2022-01-18 PROCEDURE — 99024 POSTOP FOLLOW-UP VISIT: CPT | Performed by: OTOLARYNGOLOGY

## 2022-01-18 NOTE — PROGRESS NOTES
Patient here today for post-op visit one week out from exc bcc of scalp. Patient wound is healing great and patient is very happy with result. He was given instructions on wound care after suture were removed. Patient will follow up in 3-4 month for wound check. He will call in between time if he has any concerns.

## 2022-05-18 ENCOUNTER — OFFICE VISIT (OUTPATIENT)
Dept: OTOLARYNGOLOGY | Facility: CLINIC | Age: 77
End: 2022-05-18

## 2022-05-18 VITALS — TEMPERATURE: 97 F | SYSTOLIC BLOOD PRESSURE: 151 MMHG | DIASTOLIC BLOOD PRESSURE: 79 MMHG | HEART RATE: 66 BPM

## 2022-05-18 DIAGNOSIS — L73.9 FOLLICULITIS: ICD-10-CM

## 2022-05-18 DIAGNOSIS — C44.41 BASAL CELL CARCINOMA, SCALP/NECK: Primary | ICD-10-CM

## 2022-05-18 PROCEDURE — 99213 OFFICE O/P EST LOW 20 MIN: CPT | Performed by: NURSE PRACTITIONER

## 2022-05-19 NOTE — PROGRESS NOTES
KRISTIE Peralta     PRIMARY CARE PROVIDER: Sanjeev Meraz MD  REFERRING PROVIDER: No ref. provider found    Chief Complaint   Patient presents with   • Basal Cell Carcinoma     Follow up on scalp/neck         Subjective   History of Present Illness:  Sabas Jones is a  77 y.o. male  who presents for follow up s/p excision of a basal cell carcinoma of the left scalp with rotational flap on 1/4/2022.  The patient has had a relatively normal postoperative course and currently denies any related complaints.  He denies pain, fever, chills, bleeding, or drainage.  He denies any new or worrisome skin lesion.    He reports a history of a rash on his scalp and neck.  It tends to come and go.  It is itchy and painful at times.  It becomes irritated when he wears a hat.  It is also worsened when he becomes hot and sweaty.      Past History:  Past Medical History:   Diagnosis Date   • Acid reflux    • BCC (basal cell carcinoma)     scalp bcc   • Blood in urine    • Hypertension    • Restless legs syndrome      Past Surgical History:   Procedure Laterality Date   • KNEE SURGERY Left 1977   • OTHER SURGICAL HISTORY  1991    fluid drained from left side?    • SKIN LESION EXCISION Left 1/4/2022    Procedure: Excision of basal cell carcinoma of the left scalp with rotational scalp flap closure;  Surgeon: Mannie Armenta MD;  Location: [facility];  Service: ENT;  Laterality: Left;     History reviewed. No pertinent family history.  Social History     Tobacco Use   • Smoking status: Never Smoker   • Smokeless tobacco: Never Used   Vaping Use   • Vaping Use: Never used   Substance Use Topics   • Alcohol use: Yes     Comment: occasional   • Drug use: No     Allergies:  Patient has no known allergies.    Current Outpatient Medications:   •  atenolol (TENORMIN) 50 MG tablet, Take 75 mg by mouth Daily. Takes 1.5 tablets every morning--total 75MG, Disp: , Rfl:   •  Cetirizine HCl 10 MG capsule, Take 10 mg by mouth Every Night.,  Disp: , Rfl:   •  Cholecalciferol (VITAMIN D3 PO), Take 1 tablet by mouth Every Night., Disp: , Rfl:   •  clindamycin (CLEOCIN T) 1 % external solution, Apply 1 application topically to the appropriate area as directed At Night As Needed (scalp irritation)., Disp: , Rfl:   •  guaiFENesin (MUCINEX) 600 MG 12 hr tablet, Take 600 mg by mouth As Needed for Cough or Congestion. Takes on Mondays only, Disp: , Rfl:   •  LORazepam (ATIVAN) 1 MG tablet, Take 1 mg by mouth 2 (Two) Times a Day., Disp: , Rfl:   •  multivitamin with minerals tablet tablet, Take 1 tablet by mouth Daily., Disp: , Rfl:   •  omeprazole (priLOSEC) 20 MG capsule, Take 20 mg by mouth Daily., Disp: , Rfl:   •  Sildenafil Citrate (VIAGRA PO), Take 1 tablet by mouth As Needed (ed)., Disp: , Rfl:   •  tamsulosin (FLOMAX) 0.4 MG capsule 24 hr capsule, Take 1 capsule by mouth Every Night., Disp: , Rfl:       Objective     Vital Signs:  Temp:  [97 °F (36.1 °C)] 97 °F (36.1 °C)  Heart Rate:  [66] 66  BP: (151)/(79) 151/79    Physical Exam:  Physical Exam  Vitals reviewed.   Constitutional:       General: He is not in acute distress.     Appearance: He is well-developed.   HENT:      Head: Normocephalic and atraumatic.        Comments: Diffuse areas of inflammation with surface pustules consistent with folliculitis over the scalp and base of the neck     Right Ear: External ear normal.      Left Ear: External ear normal.      Mouth/Throat:      Lips: Pink.   Eyes:      General: Lids are normal.   Pulmonary:      Effort: Pulmonary effort is normal. No respiratory distress.      Breath sounds: No stridor.   Musculoskeletal:      Cervical back: Neck supple.   Neurological:      Mental Status: He is alert and oriented to person, place, and time.   Psychiatric:         Mood and Affect: Mood normal.         Speech: Speech normal.         Behavior: Behavior normal. Behavior is cooperative.         Results Review:       Assessment   Assessment:  1. Basal cell  carcinoma, scalp/neck    2. Folliculitis        Plan   Plan:  Protect the incisions from sunlight. Sunlight to the incisions will cause permanent pigmentation to the incision line and make the incision more noticeable. After the incision has reepithelialized (typically 2-3 weeks after the procedure), you may begin to use sunscreen with an SPF of 15 or greater    May use a OTC silicone-based wound cream to the incisions to optimize the end result. Apply topically twice daily, or as directed, to help optimize wound healing and decrease redness.    He was instructed to keep routine follow-ups with his dermatologist every 6 months for skin exams and cancer surveillance.  We will also get him a follow-up with his dermatologist for treatment of folliculitis- NYDIA Darling will call him with an appointment date and time.    He was instructed to call or return should any problems arise.  He may follow-up here as needed.    No orders of the defined types were placed in this encounter.    There are no Patient Instructions on file for this visit.  Return if symptoms worsen or fail to improve, for Recheck.    My findings and recommendations were discussed and questions were answered.     Leonela Tellez, APRN  05/18/22  19:08 CDT

## (undated) DEVICE — ELECTRD NDL EZ CLN MOD 2.75IN

## (undated) DEVICE — GLV SURG BIOGEL LTX PF 8

## (undated) DEVICE — SUT SILK 2/0 SH 30IN K833H

## (undated) DEVICE — PREP SOL POVIDONE/IODINE BT 4OZ

## (undated) DEVICE — SPNG GZ WOVN 4X4IN 12PLY 10/BX STRL

## (undated) DEVICE — CABL BIPOL MEGADYNE 12FT DISP

## (undated) DEVICE — PK ENT HD AND NK 30

## (undated) DEVICE — MARKR SKIN W/RULR AND LBL

## (undated) DEVICE — PK TURNOVER RM ADV

## (undated) DEVICE — 3M™ STERI-STRIP™ REINFORCED ADHESIVE SKIN CLOSURES, R1547, 1/2 IN X 4 IN (12 MM X 100 MM), 6 STRIPS/ENVELOPE: Brand: 3M™ STERI-STRIP™